# Patient Record
Sex: FEMALE | Race: WHITE
[De-identification: names, ages, dates, MRNs, and addresses within clinical notes are randomized per-mention and may not be internally consistent; named-entity substitution may affect disease eponyms.]

---

## 2020-09-07 ENCOUNTER — HOSPITAL ENCOUNTER (EMERGENCY)
Dept: HOSPITAL 26 - MED | Age: 35
Discharge: HOME | End: 2020-09-07
Payer: COMMERCIAL

## 2020-09-07 VITALS — WEIGHT: 249 LBS | BODY MASS INDEX: 40.02 KG/M2 | HEIGHT: 66 IN

## 2020-09-07 VITALS — DIASTOLIC BLOOD PRESSURE: 55 MMHG | SYSTOLIC BLOOD PRESSURE: 105 MMHG

## 2020-09-07 VITALS — SYSTOLIC BLOOD PRESSURE: 105 MMHG | DIASTOLIC BLOOD PRESSURE: 55 MMHG

## 2020-09-07 DIAGNOSIS — Z3A.10: ICD-10-CM

## 2020-09-07 DIAGNOSIS — Z98.890: ICD-10-CM

## 2020-09-07 DIAGNOSIS — O23.41: Primary | ICD-10-CM

## 2020-09-07 DIAGNOSIS — Z88.0: ICD-10-CM

## 2020-09-07 DIAGNOSIS — O20.0: ICD-10-CM

## 2020-09-07 LAB
APPEARANCE UR: CLEAR
BASOPHILS # BLD AUTO: 0.1 K/UL (ref 0–0.22)
BASOPHILS NFR BLD AUTO: 1.2 % (ref 0–2)
BILIRUB UR QL STRIP: NEGATIVE
COLOR UR: YELLOW
EOSINOPHIL # BLD AUTO: 0.1 K/UL (ref 0–0.4)
EOSINOPHIL NFR BLD AUTO: 1.1 % (ref 0–4)
ERYTHROCYTE [DISTWIDTH] IN BLOOD BY AUTOMATED COUNT: 13.1 % (ref 11.6–13.7)
GLUCOSE UR STRIP-MCNC: NEGATIVE MG/DL
HCT VFR BLD AUTO: 36.6 % (ref 36–48)
HGB BLD-MCNC: 12.3 G/DL (ref 12–16)
HGB UR QL STRIP: (no result)
LEUKOCYTE ESTERASE UR QL STRIP: (no result)
LYMPHOCYTES # BLD AUTO: 2.8 K/UL (ref 2.5–16.5)
LYMPHOCYTES NFR BLD AUTO: 23.8 % (ref 20.5–51.1)
MCH RBC QN AUTO: 30 PG (ref 27–31)
MCHC RBC AUTO-ENTMCNC: 34 G/DL (ref 33–37)
MCV RBC AUTO: 88.6 FL (ref 80–94)
MONOCYTES # BLD AUTO: 0.7 K/UL (ref 0.8–1)
MONOCYTES NFR BLD AUTO: 5.8 % (ref 1.7–9.3)
NEUTROPHILS # BLD AUTO: 8.1 K/UL (ref 1.8–7.7)
NEUTROPHILS NFR BLD AUTO: 68.1 % (ref 42.2–75.2)
NITRITE UR QL STRIP: NEGATIVE
PH UR STRIP: 6.5 [PH] (ref 5–9)
PLATELET # BLD AUTO: 362 K/UL (ref 140–450)
RBC # BLD AUTO: 4.14 MIL/UL (ref 4.2–5.4)
RBC #/AREA URNS HPF: (no result) /HPF (ref 0–5)
WBC # BLD AUTO: 11.9 K/UL (ref 4.8–10.8)
WBC,URINE: (no result) /HPF (ref 0–5)

## 2020-09-07 PROCEDURE — 96360 HYDRATION IV INFUSION INIT: CPT

## 2020-09-07 PROCEDURE — 86900 BLOOD TYPING SEROLOGIC ABO: CPT

## 2020-09-07 PROCEDURE — 99284 EMERGENCY DEPT VISIT MOD MDM: CPT

## 2020-09-07 PROCEDURE — 87086 URINE CULTURE/COLONY COUNT: CPT

## 2020-09-07 PROCEDURE — 76817 TRANSVAGINAL US OBSTETRIC: CPT

## 2020-09-07 PROCEDURE — 81001 URINALYSIS AUTO W/SCOPE: CPT

## 2020-09-07 PROCEDURE — 85025 COMPLETE CBC W/AUTO DIFF WBC: CPT

## 2020-09-07 PROCEDURE — 84702 CHORIONIC GONADOTROPIN TEST: CPT

## 2020-09-07 PROCEDURE — 36415 COLL VENOUS BLD VENIPUNCTURE: CPT

## 2020-09-07 PROCEDURE — 86901 BLOOD TYPING SEROLOGIC RH(D): CPT

## 2020-09-07 NOTE — NUR
Patient discharged with v/s stable. Written and verbal after care instructions 
given and explained. 

Patient alert, oriented and verbalized understanding of instructions. 
Ambulatory with steady gait. All questions addressed prior to discharge. ID 
band removed. Patient advised to follow up with PMD. Rx of ZOFRAN AND MACROBID 
given. Patient educated on indication of medication including possible reaction 
and side effects. Opportunity to ask questions provided and answered.

## 2020-09-07 NOTE — NUR
33 Y/O F PRESENTS TO ED C/O VAGINAL BLEEDING X 2 DAYS. PT IS 10 WEEKS PREGNANT. 
. PT STATES VAGINAL BLEED STARTED YESTERDAY AT 1630. PER PT, BLEEDING IS 
NOT AS MUCH COMPARED TO YESTERDAY BUT IS STILL CONSTANT, "LIKE A PERIOD." PT 
ALSO C/O LOWER ABD PAIN, 8/10. PT ATTACHED TO CARDIAC MONITOR, PULSE OXIMETRY. 
BED LOCKED AND IN LOWEST POSITION, SIDE RAIL UPX1. WILL CONTINUE TO MONITOR. 



MHX: DENIES

ALLERGIES: LAYLA

## 2022-05-12 ENCOUNTER — HOSPITAL ENCOUNTER (EMERGENCY)
Dept: HOSPITAL 26 - MED | Age: 37
Discharge: HOME | End: 2022-05-12
Payer: COMMERCIAL

## 2022-05-12 VITALS — DIASTOLIC BLOOD PRESSURE: 72 MMHG | SYSTOLIC BLOOD PRESSURE: 133 MMHG

## 2022-05-12 VITALS — HEIGHT: 66 IN | WEIGHT: 257 LBS | BODY MASS INDEX: 41.3 KG/M2

## 2022-05-12 VITALS — SYSTOLIC BLOOD PRESSURE: 133 MMHG | DIASTOLIC BLOOD PRESSURE: 72 MMHG

## 2022-05-12 DIAGNOSIS — Z88.0: ICD-10-CM

## 2022-05-12 DIAGNOSIS — R20.2: ICD-10-CM

## 2022-05-12 DIAGNOSIS — Z79.899: ICD-10-CM

## 2022-05-12 DIAGNOSIS — R51.9: Primary | ICD-10-CM

## 2022-05-12 LAB
ANION GAP SERPL CALCULATED.3IONS-SCNC: 13.5 MMOL/L (ref 8–16)
BUN SERPL-MCNC: 12 MG/DL (ref 7–18)
CHLORIDE SERPL-SCNC: 106 MMOL/L (ref 98–107)
CO2 SERPL-SCNC: 24.5 MMOL/L (ref 21–32)
CREAT SERPL-MCNC: 0.9 MG/DL (ref 0.6–1.3)
GFR SERPL CREATININE-BSD FRML MDRD: 90 ML/MIN (ref 90–?)
GLUCOSE SERPL-MCNC: 105 MG/DL (ref 74–106)
POTASSIUM SERPL-SCNC: 4 MMOL/L (ref 3.5–5.1)
SODIUM SERPL-SCNC: 140 MMOL/L (ref 136–145)

## 2022-05-12 PROCEDURE — 81025 URINE PREGNANCY TEST: CPT

## 2022-05-12 PROCEDURE — 80048 BASIC METABOLIC PNL TOTAL CA: CPT

## 2022-05-12 PROCEDURE — 99284 EMERGENCY DEPT VISIT MOD MDM: CPT

## 2022-05-12 PROCEDURE — 96372 THER/PROPH/DIAG INJ SC/IM: CPT

## 2022-05-12 PROCEDURE — 70450 CT HEAD/BRAIN W/O DYE: CPT

## 2022-05-12 PROCEDURE — 36415 COLL VENOUS BLD VENIPUNCTURE: CPT

## 2022-05-12 PROCEDURE — 81002 URINALYSIS NONAUTO W/O SCOPE: CPT

## 2022-05-12 NOTE — NUR
Patient discharged with v/s stable. Written and verbal after care instructions 
given and explained. 

Patient alert, oriented and verbalized understanding of instructions. 
Ambulatory with steady gait. All questions addressed prior to discharge. ID 
band removed. Patient advised to follow up with PMD. Rx of VALIUM AND NAPROXEN 
given. Patient educated on indication of medication including possible reaction 
and side effects. Opportunity to ask questions provided and answered.

## 2022-05-12 NOTE — NUR
36 Y.O. F BIB SELF. COMPLIANING OF A HEADACHE FOR THE PAST 3 WEEKS. SAYS TODAY 
EYE STATED TO TWITCH AND TINGLING SENSATION IN FACE. SHE WAS REFERED TO 
NEUROLOGIST BY PCP. N/VTHIS MORNING. PAIN IS 9/10. NO TRAUMA TO THE HEAD. NO 
PAIN AT THIS TIME ANYWHERE ELSE. PTS NEURO ASSESSMENT WAS NORMAL. VITALS 
STABLE. PT RESTING IN BED.



HX: SCOLIOSIS